# Patient Record
Sex: FEMALE | Race: OTHER | NOT HISPANIC OR LATINO
[De-identification: names, ages, dates, MRNs, and addresses within clinical notes are randomized per-mention and may not be internally consistent; named-entity substitution may affect disease eponyms.]

---

## 2021-02-12 ENCOUNTER — TRANSCRIPTION ENCOUNTER (OUTPATIENT)
Age: 31
End: 2021-02-12

## 2021-03-05 ENCOUNTER — TRANSCRIPTION ENCOUNTER (OUTPATIENT)
Age: 31
End: 2021-03-05

## 2021-04-23 ENCOUNTER — RESULT REVIEW (OUTPATIENT)
Age: 31
End: 2021-04-23

## 2021-07-30 ENCOUNTER — TRANSCRIPTION ENCOUNTER (OUTPATIENT)
Age: 31
End: 2021-07-30

## 2021-12-31 ENCOUNTER — TRANSCRIPTION ENCOUNTER (OUTPATIENT)
Age: 31
End: 2021-12-31

## 2022-01-12 ENCOUNTER — TRANSCRIPTION ENCOUNTER (OUTPATIENT)
Age: 32
End: 2022-01-12

## 2022-04-25 ENCOUNTER — RESULT REVIEW (OUTPATIENT)
Age: 32
End: 2022-04-25

## 2022-07-26 ENCOUNTER — NON-APPOINTMENT (OUTPATIENT)
Age: 32
End: 2022-07-26

## 2022-08-03 ENCOUNTER — NON-APPOINTMENT (OUTPATIENT)
Age: 32
End: 2022-08-03

## 2022-09-01 ENCOUNTER — APPOINTMENT (OUTPATIENT)
Dept: HUMAN REPRODUCTION | Facility: CLINIC | Age: 32
End: 2022-09-01

## 2022-11-17 ENCOUNTER — NON-APPOINTMENT (OUTPATIENT)
Age: 32
End: 2022-11-17

## 2022-11-30 PROBLEM — Z00.00 ENCOUNTER FOR PREVENTIVE HEALTH EXAMINATION: Status: ACTIVE | Noted: 2022-11-30

## 2022-12-02 ENCOUNTER — APPOINTMENT (OUTPATIENT)
Dept: ANTEPARTUM | Facility: CLINIC | Age: 32
End: 2022-12-02

## 2022-12-02 ENCOUNTER — ASOB RESULT (OUTPATIENT)
Age: 32
End: 2022-12-02

## 2022-12-02 PROCEDURE — 76811 OB US DETAILED SNGL FETUS: CPT

## 2022-12-02 PROCEDURE — 76817 TRANSVAGINAL US OBSTETRIC: CPT | Mod: 59

## 2022-12-16 ENCOUNTER — APPOINTMENT (OUTPATIENT)
Dept: ANTEPARTUM | Facility: CLINIC | Age: 32
End: 2022-12-16

## 2022-12-16 ENCOUNTER — ASOB RESULT (OUTPATIENT)
Age: 32
End: 2022-12-16

## 2022-12-16 PROCEDURE — 76816 OB US FOLLOW-UP PER FETUS: CPT

## 2023-02-24 ENCOUNTER — ASOB RESULT (OUTPATIENT)
Age: 33
End: 2023-02-24

## 2023-02-24 ENCOUNTER — APPOINTMENT (OUTPATIENT)
Dept: ANTEPARTUM | Facility: CLINIC | Age: 33
End: 2023-02-24
Payer: COMMERCIAL

## 2023-02-24 PROCEDURE — 76819 FETAL BIOPHYS PROFIL W/O NST: CPT | Mod: 59

## 2023-02-24 PROCEDURE — 76816 OB US FOLLOW-UP PER FETUS: CPT

## 2023-03-15 ENCOUNTER — APPOINTMENT (OUTPATIENT)
Dept: PEDIATRICS | Facility: CLINIC | Age: 33
End: 2023-03-15
Payer: COMMERCIAL

## 2023-03-15 DIAGNOSIS — Z76.81 EXPECTANT PARENT(S) PREBIRTH PEDIATRICIAN VISIT: ICD-10-CM

## 2023-03-15 PROCEDURE — ZZZZZ: CPT

## 2023-03-16 PROBLEM — Z76.81 PEDIATRIC PRE-BIRTH VISIT: Status: RESOLVED | Noted: 2023-03-16 | Resolved: 2023-03-16

## 2023-03-16 NOTE — DISCUSSION/SUMMARY
[FreeTextEntry1] : Z76.81 Pediatric Prebirth Visit\par [id number] Consult\par Inquiries for Prenatal problems that may have implications for the baby reviewed\par Breast-feeding best practices, benefits, risks of not breast-feeding also reviewed.  [email or web address] to access good information regarding breast-feeding in the first 3 days of life and beyond provided. \par Fist day and days of breast feeding summarized.  \par Importance of maternal self care as a tactic to improve breast feeding, including fixing painful latching, was emphasized.\par Risks and benefits of vaccines as well as not vaccinating within the timeframe recommended was reviewed.  This included the hepatitis B vaccine in the hospital that will be recommended.\par How to get in touch with the doctor on-call, and how to access the right online care in a de-escalating manner was reviewed.  Especially, through [email or web address], tools tab, and symptom .  This may decrease the perceived need for unnecessary access to care, and direct toward urgent care more when appropriate.  For example, 100.2 F temperature rectally is not necessarily a fever. Generic searches of fever in an infant may direct parents to emergent care unnecessarily. \par A variety of other questions were reviewed regarding newborns and  beyond that.  \par Age and development based approach to parenting reviewed. \par We will see the baby within approximately  2 days of leaving the hospital.\par .\par

## 2023-03-24 ENCOUNTER — APPOINTMENT (OUTPATIENT)
Dept: ANTEPARTUM | Facility: CLINIC | Age: 33
End: 2023-03-24

## 2023-04-09 ENCOUNTER — INPATIENT (INPATIENT)
Facility: HOSPITAL | Age: 33
LOS: 2 days | Discharge: HOME | End: 2023-04-12
Payer: COMMERCIAL

## 2023-04-09 VITALS
RESPIRATION RATE: 18 BRPM | HEART RATE: 81 BPM | TEMPERATURE: 98 F | SYSTOLIC BLOOD PRESSURE: 120 MMHG | DIASTOLIC BLOOD PRESSURE: 72 MMHG

## 2023-04-09 DIAGNOSIS — O47.1 FALSE LABOR AT OR AFTER 37 COMPLETED WEEKS OF GESTATION: ICD-10-CM

## 2023-04-09 NOTE — OB PROVIDER H&P - NSHPPHYSICALEXAM_GEN_ALL_CORE
T(C): 36.4 (04-09-23 @ 23:16), Max: 36.4 (04-09-23 @ 23:16)  HR: 81 (04-09-23 @ 23:28) (81 - 81)  BP: 120/72 (04-09-23 @ 23:28) (120/72 - 120/72)  RR: 18 (04-09-23 @ 23:16) (18 - 18)  SpO2: --    Gen: NAD, well-appearing, AAOx3   Abd: Soft, gravid  Ext: non-tender, non-edematous  SVE: 3cm dilation, 50% effacement, -3 station   Bedside sono: Vertex, anterior placenta  FHT: Baseline rate 130; moderate variability, + accels, no decels  [place]: Contractions 3-6 mins apart T(C): 36.4 (04-09-23 @ 23:16), Max: 36.4 (04-09-23 @ 23:16)  HR: 81 (04-09-23 @ 23:28) (81 - 81)  BP: 120/72 (04-09-23 @ 23:28) (120/72 - 120/72)  RR: 18 (04-09-23 @ 23:16) (18 - 18)    Gen: NAD, well-appearing, AAOx3   Abd: Soft, gravid, non tender  Ext: non-tender, non-edematous  SVE: 3/50/-3, intact    Bedside sono: Vertex, anterior placenta  FHT: Baseline rate 130; moderate variability, + accels, no decels  [place]: q3-6min

## 2023-04-09 NOTE — OB RN PATIENT PROFILE - FALL HARM RISK - UNIVERSAL INTERVENTIONS
Bed in lowest position, wheels locked, appropriate side rails in place/Call bell, personal items and telephone in reach/Instruct patient to call for assistance before getting out of bed or chair/Non-slip footwear when patient is out of bed/[place] to call system/Physically safe environment - no spills, clutter or unnecessary equipment/Purposeful Proactive Rounding/Room/bathroom lighting operational, light cord in reach

## 2023-04-09 NOTE — OB RN TRIAGE NOTE - TEMPERATURE IN CELSIUS (DEGREES C)
----- Message from Belle May sent at 2/28/2020  1:43 PM CST -----  Contact: Self [phone]  Patient would like to speak with you about a personal matter. Please advise     36.4

## 2023-04-09 NOTE — OB PROVIDER H&P - HISTORY OF PRESENT ILLNESS
Xenia is a 33y  at 39.1 weeks GA by LMP consistent with 1st trimester sono who presents to L&D for spontaneous onset of labor at term. Patient endorses CTX q2-5 minutes starting at 4pm. They are becoming more painful Patient denies vaginal bleeding or leakage of fluid. She endorses good fetal movement. No other complaints at this time. Prenatal course is remarkable only for subchorionic hemorrhage at 8 weeks that resolved at 11 weeks. Last visit was in the office on Friday when she had no cervical changes. Last sono was ~3/27 with the baby vertex.  JONATHON: 4/15/23  LMP: 22    PMH: Denies any  PSH: Denies any  SH: Denies EtOH, tobacco and illicit drug use during this pregnancy; feels safe at home   Meds: PNVs, baby aspirin   Allergies: NKDA    BMI: __  Sono: Vertex, anterior placenta  EFW:  3600g (last EFW was 3175g approximately 2 weeks ago) Xenia is a 33y  at 39.1 weeks GA by LMP consistent with 1st trimester sono who presents to L&D for spontaneous onset of labor at term. Patient endorses CTX q2-5 minutes starting at 4pm. They are becoming more painful and more frequent. Patient denies vaginal bleeding or leakage of fluid. She endorses good fetal movement. No other complaints at this time. Last prenatal visit was Friday, , exam at that time was 0/0/-3. Last sono was ~3/27 - cephalic presentation, ~ 7lbs.    JONATHON: 4/15/23  LMP: 22    PNC w Dr. Astudillo:  1. Subchorionic hemorrhage at 8wga, resolved at 11wga    POB:   G1: ectopic pregnancy (treated medically, no surgery) in 10/21  G2: SAB at 6 weeks in   G3: SAB at 6 weeks in   PGYN: + hx of ovarian cyst when younger, no hx of fibroids, denies STD hx, denies abnormal PAPs, +hx of dysmenorrhea  PMH: Denies  PSH: Denies  SH: Denies EtOH, tobacco and illicit drug use during this pregnancy; feels safe at home   Meds: PNVs, baby aspirin   Allergies: NKDA Xenia is a 33y  at 39.1 weeks GA by LMP consistent with 1st trimester sono who presents to L&D for spontaneous onset of labor at term. Patient endorses CTX q2-5 minutes starting at 4pm. They are becoming more painful and more frequent. Patient denies vaginal bleeding or leakage of fluid. She endorses good fetal movement. No other complaints at this time. Last prenatal visit was Friday, , was 0cm on exam at that time. Last sono was ~3/27 - cephalic presentation, ~ 7lbs.    JONATHON: 4/15/23  LMP: 22    PNC w Dr. Astudillo:  1. Subchorionic hemorrhage at 8wga, resolved at 11wga    POB:   G1: ectopic pregnancy (treated medically, no surgery) in   G2: SAB at 6 weeks in   G3: SAB at 6 weeks in   PGYN: + hx of ovarian cyst when younger, no hx of fibroids, denies STD hx, denies abnormal PAPs, +hx of dysmenorrhea  PMH: Denies  PSH: Denies  SH: Denies EtOH, tobacco and illicit drug use during this pregnancy; feels safe at home   Meds: PNVs, baby aspirin   Allergies: NKDA

## 2023-04-09 NOTE — OB PROVIDER H&P - ATTENDING COMMENTS
33y  at 39.1 weeks GA by LMP consistent with 1st trimester sono who presents to L&D for spontaneous onset of labor at term, consent for care signed after questions answered.

## 2023-04-09 NOTE — OB PROVIDER H&P - ASSESSMENT
Assessment: Xenia is a 33y  at 39.1 weeks GA by LMP consistent with 1st trimester sono who presents to L&D for spontaneous onset of labor at term.    Plan:   -Admit to L&D  -Consent  -Admission labs  -NPO, except ice chips   -IV fluids  -Labor: Intact membranes, contractions q3-6mins, cervical exam (3cm, 50%, -3). Likely AROM post-consent and epidural.   -Fetus: Cat I tracing. Continuous toco and fetal monitoring.   -GBS: Negative, no GBS ppx required   -Analgesia: Patient requests epidural    Discussed with Dr. Fregoso Assessment: Xenia is a 33y  at 39.1 weeks GA by LMP consistent with 1st trimester sono who presents to L&D for spontaneous onset of labor at term.    Plan:   -Admit to L&D  -Consent  -Admission labs  -NPO, except ice chips   -IV fluids  -Labor: Intact membranes, contractions q3-6mins, cervical exam (3cm, 50%, -3). For AROM post epidural.   -Fetus: Cat I tracing. Continuous toco and fetal monitoring.   -GBS: Negative, no GBS ppx required   -Analgesia: Patient requests epidural    Discussed with Dr. Fregoso and Dr. Astudillo

## 2023-04-09 NOTE — OB PROVIDER H&P - NS_OBGYNHISTORY_OBGYN_ALL_OB_FT
POB: ; ectopic pregnancy (treated medically, no surgery) in 10/21; miscarriage at 6 weeks in ; miscarriage at 6 weeks in ; current pregnancy complications per above. No hx of gestational hypertension or diabetes.  PGYN: + hx of ovarian cyst when younger, believes it has resolved; no hx of fibroids, denies STD hx, denies abnormal PAPs, +hx of dysmenorrhea see hpi

## 2023-04-10 ENCOUNTER — TRANSCRIPTION ENCOUNTER (OUTPATIENT)
Age: 33
End: 2023-04-10

## 2023-04-10 LAB
BASOPHILS # BLD AUTO: 0.05 K/UL — SIGNIFICANT CHANGE UP (ref 0–0.2)
BASOPHILS NFR BLD AUTO: 0.6 % — SIGNIFICANT CHANGE UP (ref 0–2)
BLD GP AB SCN SERPL QL: SIGNIFICANT CHANGE UP
COVID-19 SPIKE DOMAIN AB INTERP: POSITIVE
COVID-19 SPIKE DOMAIN ANTIBODY RESULT: >250 U/ML — HIGH
EOSINOPHIL # BLD AUTO: 0.04 K/UL — SIGNIFICANT CHANGE UP (ref 0–0.5)
EOSINOPHIL NFR BLD AUTO: 0.5 % — SIGNIFICANT CHANGE UP (ref 0–6)
HCT VFR BLD CALC: 23.9 % — LOW (ref 34.5–45)
HCT VFR BLD CALC: 28.8 % — LOW (ref 34.5–45)
HGB BLD-MCNC: 7.7 G/DL — LOW (ref 11.5–15.5)
HGB BLD-MCNC: 9.3 G/DL — LOW (ref 11.5–15.5)
IMM GRANULOCYTES NFR BLD AUTO: 0.7 % — SIGNIFICANT CHANGE UP (ref 0–0.9)
LYMPHOCYTES # BLD AUTO: 1.4 K/UL — SIGNIFICANT CHANGE UP (ref 1–3.3)
LYMPHOCYTES # BLD AUTO: 16.1 % — SIGNIFICANT CHANGE UP (ref 13–44)
MCHC RBC-ENTMCNC: 27 PG — SIGNIFICANT CHANGE UP (ref 27–34)
MCHC RBC-ENTMCNC: 27.1 PG — SIGNIFICANT CHANGE UP (ref 27–34)
MCHC RBC-ENTMCNC: 32.2 GM/DL — SIGNIFICANT CHANGE UP (ref 32–36)
MCHC RBC-ENTMCNC: 32.3 GM/DL — SIGNIFICANT CHANGE UP (ref 32–36)
MCV RBC AUTO: 83.5 FL — SIGNIFICANT CHANGE UP (ref 80–100)
MCV RBC AUTO: 84.2 FL — SIGNIFICANT CHANGE UP (ref 80–100)
MONOCYTES # BLD AUTO: 0.62 K/UL — SIGNIFICANT CHANGE UP (ref 0–0.9)
MONOCYTES NFR BLD AUTO: 7.2 % — SIGNIFICANT CHANGE UP (ref 2–14)
NEUTROPHILS # BLD AUTO: 6.5 K/UL — SIGNIFICANT CHANGE UP (ref 1.8–7.4)
NEUTROPHILS NFR BLD AUTO: 74.9 % — SIGNIFICANT CHANGE UP (ref 43–77)
PLATELET # BLD AUTO: 309 K/UL — SIGNIFICANT CHANGE UP (ref 150–400)
PLATELET # BLD AUTO: 386 K/UL — SIGNIFICANT CHANGE UP (ref 150–400)
RBC # BLD: 2.84 M/UL — LOW (ref 3.8–5.2)
RBC # BLD: 3.45 M/UL — LOW (ref 3.8–5.2)
RBC # FLD: 13.2 % — SIGNIFICANT CHANGE UP (ref 10.3–14.5)
RBC # FLD: 13.3 % — SIGNIFICANT CHANGE UP (ref 10.3–14.5)
RUBV IGG SER-ACNC: 2.1 INDEX — SIGNIFICANT CHANGE UP
RUBV IGG SER-IMP: POSITIVE — SIGNIFICANT CHANGE UP
SARS-COV-2 IGG+IGM SERPL QL IA: >250 U/ML — HIGH
SARS-COV-2 IGG+IGM SERPL QL IA: POSITIVE
SARS-COV-2 RNA SPEC QL NAA+PROBE: SIGNIFICANT CHANGE UP
T PALLIDUM AB TITR SER: NEGATIVE — SIGNIFICANT CHANGE UP
WBC # BLD: 19.73 K/UL — HIGH (ref 3.8–10.5)
WBC # BLD: 8.67 K/UL — SIGNIFICANT CHANGE UP (ref 3.8–10.5)
WBC # FLD AUTO: 19.73 K/UL — HIGH (ref 3.8–10.5)
WBC # FLD AUTO: 8.67 K/UL — SIGNIFICANT CHANGE UP (ref 3.8–10.5)

## 2023-04-10 RX ORDER — SIMETHICONE 80 MG/1
80 TABLET, CHEWABLE ORAL EVERY 4 HOURS
Refills: 0 | Status: DISCONTINUED | OUTPATIENT
Start: 2023-04-10 | End: 2023-04-12

## 2023-04-10 RX ORDER — DIBUCAINE 1 %
1 OINTMENT (GRAM) RECTAL EVERY 6 HOURS
Refills: 0 | Status: DISCONTINUED | OUTPATIENT
Start: 2023-04-10 | End: 2023-04-12

## 2023-04-10 RX ORDER — TETANUS TOXOID, REDUCED DIPHTHERIA TOXOID AND ACELLULAR PERTUSSIS VACCINE, ADSORBED 5; 2.5; 8; 8; 2.5 [IU]/.5ML; [IU]/.5ML; UG/.5ML; UG/.5ML; UG/.5ML
0.5 SUSPENSION INTRAMUSCULAR ONCE
Refills: 0 | Status: COMPLETED | OUTPATIENT
Start: 2023-04-10

## 2023-04-10 RX ORDER — OXYTOCIN 10 UNIT/ML
333.33 VIAL (ML) INJECTION
Qty: 20 | Refills: 0 | Status: COMPLETED | OUTPATIENT
Start: 2023-04-10 | End: 2023-04-10

## 2023-04-10 RX ORDER — OXYTOCIN 10 UNIT/ML
41.67 VIAL (ML) INJECTION
Qty: 20 | Refills: 0 | Status: DISCONTINUED | OUTPATIENT
Start: 2023-04-10 | End: 2023-04-12

## 2023-04-10 RX ORDER — CITRIC ACID/SODIUM CITRATE 300-500 MG
30 SOLUTION, ORAL ORAL ONCE
Refills: 0 | Status: DISCONTINUED | OUTPATIENT
Start: 2023-04-10 | End: 2023-04-10

## 2023-04-10 RX ORDER — MAGNESIUM HYDROXIDE 400 MG/1
30 TABLET, CHEWABLE ORAL
Refills: 0 | Status: DISCONTINUED | OUTPATIENT
Start: 2023-04-10 | End: 2023-04-12

## 2023-04-10 RX ORDER — IBUPROFEN 200 MG
600 TABLET ORAL EVERY 6 HOURS
Refills: 0 | Status: DISCONTINUED | OUTPATIENT
Start: 2023-04-10 | End: 2023-04-12

## 2023-04-10 RX ORDER — BENZOCAINE 10 %
1 GEL (GRAM) MUCOUS MEMBRANE EVERY 6 HOURS
Refills: 0 | Status: DISCONTINUED | OUTPATIENT
Start: 2023-04-10 | End: 2023-04-12

## 2023-04-10 RX ORDER — OXYCODONE HYDROCHLORIDE 5 MG/1
5 TABLET ORAL ONCE
Refills: 0 | Status: DISCONTINUED | OUTPATIENT
Start: 2023-04-10 | End: 2023-04-12

## 2023-04-10 RX ORDER — LANOLIN
1 OINTMENT (GRAM) TOPICAL EVERY 6 HOURS
Refills: 0 | Status: DISCONTINUED | OUTPATIENT
Start: 2023-04-10 | End: 2023-04-12

## 2023-04-10 RX ORDER — OXYTOCIN 10 UNIT/ML
2 VIAL (ML) INJECTION
Qty: 30 | Refills: 0 | Status: DISCONTINUED | OUTPATIENT
Start: 2023-04-10 | End: 2023-04-12

## 2023-04-10 RX ORDER — ACETAMINOPHEN 500 MG
975 TABLET ORAL
Refills: 0 | Status: DISCONTINUED | OUTPATIENT
Start: 2023-04-10 | End: 2023-04-12

## 2023-04-10 RX ORDER — IBUPROFEN 200 MG
600 TABLET ORAL EVERY 6 HOURS
Refills: 0 | Status: COMPLETED | OUTPATIENT
Start: 2023-04-10 | End: 2024-03-08

## 2023-04-10 RX ORDER — PRAMOXINE HYDROCHLORIDE 150 MG/15G
1 AEROSOL, FOAM RECTAL EVERY 4 HOURS
Refills: 0 | Status: DISCONTINUED | OUTPATIENT
Start: 2023-04-10 | End: 2023-04-12

## 2023-04-10 RX ORDER — CHLORHEXIDINE GLUCONATE 213 G/1000ML
1 SOLUTION TOPICAL ONCE
Refills: 0 | Status: DISCONTINUED | OUTPATIENT
Start: 2023-04-10 | End: 2023-04-10

## 2023-04-10 RX ORDER — DIPHENHYDRAMINE HCL 50 MG
25 CAPSULE ORAL EVERY 6 HOURS
Refills: 0 | Status: DISCONTINUED | OUTPATIENT
Start: 2023-04-10 | End: 2023-04-12

## 2023-04-10 RX ORDER — TETANUS TOXOID, REDUCED DIPHTHERIA TOXOID AND ACELLULAR PERTUSSIS VACCINE, ADSORBED 5; 2.5; 8; 8; 2.5 [IU]/.5ML; [IU]/.5ML; UG/.5ML; UG/.5ML; UG/.5ML
0.5 SUSPENSION INTRAMUSCULAR ONCE
Refills: 0 | Status: COMPLETED | OUTPATIENT
Start: 2023-04-10 | End: 2023-04-10

## 2023-04-10 RX ORDER — OXYCODONE HYDROCHLORIDE 5 MG/1
5 TABLET ORAL
Refills: 0 | Status: DISCONTINUED | OUTPATIENT
Start: 2023-04-10 | End: 2023-04-12

## 2023-04-10 RX ORDER — HYDROCORTISONE 1 %
1 OINTMENT (GRAM) TOPICAL EVERY 6 HOURS
Refills: 0 | Status: DISCONTINUED | OUTPATIENT
Start: 2023-04-10 | End: 2023-04-12

## 2023-04-10 RX ORDER — KETOROLAC TROMETHAMINE 30 MG/ML
30 SYRINGE (ML) INJECTION ONCE
Refills: 0 | Status: DISCONTINUED | OUTPATIENT
Start: 2023-04-10 | End: 2023-04-10

## 2023-04-10 RX ORDER — AER TRAVELER 0.5 G/1
1 SOLUTION RECTAL; TOPICAL EVERY 4 HOURS
Refills: 0 | Status: DISCONTINUED | OUTPATIENT
Start: 2023-04-10 | End: 2023-04-12

## 2023-04-10 RX ORDER — SODIUM CHLORIDE 9 MG/ML
1000 INJECTION, SOLUTION INTRAVENOUS
Refills: 0 | Status: DISCONTINUED | OUTPATIENT
Start: 2023-04-10 | End: 2023-04-10

## 2023-04-10 RX ORDER — SODIUM CHLORIDE 9 MG/ML
3 INJECTION INTRAMUSCULAR; INTRAVENOUS; SUBCUTANEOUS EVERY 8 HOURS
Refills: 0 | Status: DISCONTINUED | OUTPATIENT
Start: 2023-04-10 | End: 2023-04-12

## 2023-04-10 RX ADMIN — Medication 30 MILLIGRAM(S): at 14:15

## 2023-04-10 RX ADMIN — SODIUM CHLORIDE 125 MILLILITER(S): 9 INJECTION, SOLUTION INTRAVENOUS at 00:21

## 2023-04-10 RX ADMIN — Medication 600 MILLIGRAM(S): at 18:17

## 2023-04-10 RX ADMIN — SODIUM CHLORIDE 125 MILLILITER(S): 9 INJECTION, SOLUTION INTRAVENOUS at 08:21

## 2023-04-10 RX ADMIN — Medication 30 MILLIGRAM(S): at 13:25

## 2023-04-10 RX ADMIN — Medication 975 MILLIGRAM(S): at 21:30

## 2023-04-10 RX ADMIN — Medication 1000 MILLIUNIT(S)/MIN: at 13:26

## 2023-04-10 RX ADMIN — SODIUM CHLORIDE 125 MILLILITER(S): 9 INJECTION, SOLUTION INTRAVENOUS at 01:18

## 2023-04-10 RX ADMIN — Medication 600 MILLIGRAM(S): at 23:45

## 2023-04-10 RX ADMIN — Medication 2 MILLIUNIT(S)/MIN: at 04:23

## 2023-04-10 NOTE — OB PROVIDER LABOR PROGRESS NOTE - ASSESSMENT
Continue pitocin currently at 4mu  Recheck with constant pressure
Cat I   s/p AROM, clr  elaine reg  for pit if ctxs space

## 2023-04-10 NOTE — OB PROVIDER LABOR PROGRESS NOTE - NS_STATION_OBGYN_ALL_OB_NU
Vaccine Information Statement(s) was given today. This has been reviewed, questions answered, and verbal consent given by Patient for injection(s) and administration of Influenza (Inactivated).      Patient tolerated without incident. See immunization grid for documentation.        
-2
-1

## 2023-04-10 NOTE — DISCHARGE NOTE OB - HOSPITAL COURSE
S/p spontaneous vaginal delivery. Delivery was uncomplicated. She was transferred from L&D to postpartum unit without complications during her stay. Upon discharge she is voiding, tolerating PO, ambulating, lochia is decreasing, labs and vitals are stable, and pain is well controlled.

## 2023-04-10 NOTE — DISCHARGE NOTE OB - PATIENT PORTAL LINK FT
You can access the FollowMyHealth Patient Portal offered by  by registering at the following website: [email or web address]. By joining [facility]’s FollowMyHealth portal, you will also be able to view your health information using other applications (apps) compatible with our system.

## 2023-04-10 NOTE — OB RN DELIVERY SUMMARY - NS_SEPSISRSKCALC_OBGYN_ALL_OB_FT
EOS calculated successfully. EOS Risk Factor: 0.5/1000 live births ([facility] national incidence); GA=39w2d; Temp=98.6; ROM=10.4; GBS='Negative'; Antibiotics='No antibiotics or any antibiotics < 2 hrs prior to birth'

## 2023-04-10 NOTE — DISCHARGE NOTE OB - CARE PROVIDER_API CALL
Jossie Astudillo)  Obstetrics and Gynecology  [address] [place] [address]  Phone: [phone]  Fax: [phone]  Follow Up Time: 1 month

## 2023-04-10 NOTE — OB PROVIDER DELIVERY SUMMARY - NSPROVIDERDELIVERYNOTE_OBGYN_ALL_OB_FT
After a midline episiotomy was made to create enough space for delivery,  at 1244 of a live female, and Apgars 9/9. Delivered OA, no nuchal cord, clear fluid. Infant's head delivered with maternal expulsive efforts. Shoulders delivered without difficulty followed by the rest of the body. Nose and mouth were bulb suctioned. Cord blood collection was obtained. Baby handed to patient. During delivery of placenta the cord avulsed and a manual extraction was performed. MT atony noted; arm straightened to ease pitocin delivery. Fundus firm, minimal bleeding. Perineum and vagina inspected – small 2nd extension of episiotomy noted and repaired under local anesthesia with chromic. EBL 234cc. Hemostasis noted. Pt tolerated procedure well, in stable condition, recovering in LDR. Infant in LDR. Instrument/sponge count correct x 2 and confirmed by nurse.

## 2023-04-10 NOTE — OB PROVIDER LABOR PROGRESS NOTE - NS_OBIHIFHRDETAILS_OBGYN_ALL_OB_FT
140bpm baseline, moderate variability with periods of minimal, + accelerations, intermittent small variable decelerations
120 bpm mod variability +accels -decels

## 2023-04-10 NOTE — DISCHARGE NOTE OB - NS MD DC FALL RISK RISK
For information on Fall & Injury Prevention, visit: [email or web address] OR  [email or web address] OR  https://www.cdc.gov/steadi/patient.html

## 2023-04-10 NOTE — DISCHARGE NOTE OB - CARE PLAN
1 Principal Discharge DX:	Normal spontaneous vaginal delivery  Assessment and plan of treatment:	- Please call your provider to schedule a postpartum visit within 4-6 weeks. Contact information provided below.  - Prescriptions have been sent to pharmacy. Please take medications as directed.   - Patient is to continue with regular diet and activity as tolerated, nothing per vagina for 6 weeks, and exclusive breast feeding for the first 6 months is recommended.   - If you have additional concerns, or if you experience fevers > 100.4 F or heavy vaginal bleeding that is not decreasing, please inform your provider.

## 2023-04-10 NOTE — OB PROVIDER DELIVERY SUMMARY - NSSELHIDDEN_OBGYN_ALL_OB_FT
[NS_DeliveryAttending1_OBGYN_ALL_OB_FT:[id number]==],[NS_DeliveryAssist1_OBGYN_ALL_OB_FT:[id number]]

## 2023-04-10 NOTE — OB RN DELIVERY SUMMARY - NSSELHIDDEN_OBGYN_ALL_OB_FT
[NS_DeliveryAttending1_OBGYN_ALL_OB_FT:[id number]==],[NS_DeliveryAssist1_OBGYN_ALL_OB_FT:[id number]],[NS_DeliveryRN_OBGYN_ALL_OB_FT:[id number]],[NS_CirculateRN2_OBGYN_ALL_OB_FT:[id number]]

## 2023-04-10 NOTE — CHART NOTE - NSCHARTNOTEFT_GEN_A_CORE
Called to patient bedside to evaluate as pt felt faint after waking up from nap. Had some juice and crackers, was feeling better on evaluation. Denies SOB, CP, palpitation, lightheadedness, dizziness. Vitals stable. Mild tachycardia. Will continue to monitor.

## 2023-04-10 NOTE — DISCHARGE NOTE OB - MEDICATION SUMMARY - MEDICATIONS TO TAKE
I will START or STAY ON the medications listed below when I get home from the hospital:    ibuprofen 600 mg oral tablet  -- 1 tab(s) by mouth every 6 hours as needed for  moderate pain  -- Indication: For pain    Tylenol 325 mg oral capsule  -- 3 cap(s) by mouth every 6 hours as needed for  moderate pain  -- Indication: For pain

## 2023-04-11 LAB
HCT VFR BLD CALC: 27 % — LOW (ref 34.5–45)
HGB BLD-MCNC: 8.4 G/DL — LOW (ref 11.5–15.5)

## 2023-04-11 RX ADMIN — Medication 975 MILLIGRAM(S): at 02:44

## 2023-04-11 RX ADMIN — OXYCODONE HYDROCHLORIDE 5 MILLIGRAM(S): 5 TABLET ORAL at 18:23

## 2023-04-11 RX ADMIN — Medication 975 MILLIGRAM(S): at 21:08

## 2023-04-11 RX ADMIN — Medication 600 MILLIGRAM(S): at 06:18

## 2023-04-11 RX ADMIN — Medication 600 MILLIGRAM(S): at 13:10

## 2023-04-11 RX ADMIN — Medication 600 MILLIGRAM(S): at 12:13

## 2023-04-11 RX ADMIN — SODIUM CHLORIDE 3 MILLILITER(S): 9 INJECTION INTRAMUSCULAR; INTRAVENOUS; SUBCUTANEOUS at 15:56

## 2023-04-11 RX ADMIN — OXYCODONE HYDROCHLORIDE 5 MILLIGRAM(S): 5 TABLET ORAL at 10:24

## 2023-04-11 RX ADMIN — Medication 975 MILLIGRAM(S): at 14:55

## 2023-04-11 RX ADMIN — OXYCODONE HYDROCHLORIDE 5 MILLIGRAM(S): 5 TABLET ORAL at 17:23

## 2023-04-11 RX ADMIN — OXYCODONE HYDROCHLORIDE 5 MILLIGRAM(S): 5 TABLET ORAL at 09:24

## 2023-04-11 RX ADMIN — Medication 975 MILLIGRAM(S): at 15:55

## 2023-04-11 RX ADMIN — Medication 1 TABLET(S): at 12:13

## 2023-04-11 NOTE — PROGRESS NOTE ADULT - SUBJECTIVE AND OBJECTIVE BOX
INTERVAL HPI/OVERNIGHT EVENTS:  33y Female s/p labor epidural on 4/10/2023    Vital Signs Last 24 Hrs  T(C): 36.8 (11 Apr 2023 15:24), Max: 36.8 (11 Apr 2023 15:24)  T(F): 98.2 (11 Apr 2023 15:24), Max: 98.2 (11 Apr 2023 15:24)  HR: 78 (11 Apr 2023 15:24) (78 - 89)  BP: 96/61 (11 Apr 2023 15:24) (96/61 - 108/63)  BP(mean): --  RR: 18 (11 Apr 2023 15:24) (16 - 18)  SpO2: 98% (11 Apr 2023 15:24) (96% - 98%)            Patient's overall anesthesia satisfaction: Positive    Patient seen and doing well     No headache      No residual numbness or weakness, sensory and motor function intact    Site examined    No anesthetic complications or complaints noted or reported

## 2023-04-11 NOTE — PROGRESS NOTE ADULT - SUBJECTIVE AND OBJECTIVE BOX
SURI DANG is a 33y  now PPD#1 s/p spontaneous vaginal delivery at 39w2d bgestation, c/b cord avulsion requiring manual placenta extraction.    S:    No acute events overnight.   Pt reports sharp vaginal pain that results in nausea when standing. Using ice packs currently has not tried oxycodone.   Tolerating PO.   + flatus/-BM/+ voiding   She endorses appropriate lochia, which is decreasing.    She denies fevers, chills, nausea and vomiting.   She denies lightheadedness, dizziness, palpitations, chest pain and SOB.     O:    T(C): 36.7 (04-10-23 @ 19:45), Max: 37.2 (04-10-23 @ 17:29)  HR: 89 (04-10-23 @ 19:45) (79 - 120)  BP: 108/63 (04-10-23 @ 19:45) (92/48 - 134/60)  RR: 16 (04-10-23 @ 19:45) (16 - 16)  SpO2: 98% (04-10-23 @ 19:45) (96% - 100%)    Gen: NAD, AOx3  Breast: Nontender, non-engorged   Abdomen:  Soft, non-tender, non-distended  Uterus:  Fundus firm below umbilicus  VE:  Expectant lochia  Ext:  Non-tender and non-edematous                          7.7    19.73 )-----------( 309      ( 10 Apr 2023 21:19 )             23.9            SURI DANG is a 33y  now PPD#1 s/p spontaneous vaginal delivery at 39w2d bgestation, c/b cord avulsion requiring manual placenta extraction.    S:    No acute events overnight.   Pt reports sharp vaginal pain that results in nausea when standing. Using ice packs currently has not tried oxycodone.   Tolerating PO.   + flatus/-BM/+ voiding   She endorses appropriate lochia, which is decreasing.    She denies fevers, chills, nausea and vomiting.   She denies lightheadedness, dizziness, palpitations, chest pain and SOB.     O:    T(C): 36.7 (04-10-23 @ 19:45), Max: 37.2 (04-10-23 @ 17:29)  HR: 89 (04-10-23 @ 19:45) (79 - 120)  BP: 108/63 (04-10-23 @ 19:45) (92/48 - 134/60)  RR: 16 (04-10-23 @ 19:45) (16 - 16)  SpO2: 98% (04-10-23 @ 19:45) (96% - 100%)    Gen: NAD, AOx3  Breast: Nontender, non-engorged   Abdomen:  Soft, non-tender, non-distended  Uterus:  Fundus firm below umbilicus  VE:  Expectant lochia, swelling noted, hemorrhoid noted  Ext:  Non-tender and non-edematous                          7.7    19.73 )-----------( 309      ( 10 Apr 2023 21:19 )             23.9

## 2023-04-11 NOTE — PROGRESS NOTE ADULT - ASSESSMENT
SURI DANG is a 33y  now PPD#1 s/p spontaneous vaginal delivery at 39w2d bgestation, c/b cord avulsion requiring manual placenta extraction.    -Vital signs stable  -Hgb: 9.3>7.7  -Voiding, tolerating PO, bowel function nml   -Advance care as tolerated   -Encourage trialing oxycodone and will reassess the patient's pain  -Continue routine postpartum care and education  -Healthy female infant  -Dispo: Patient to be discharged when meeting all postpartum and postoperative milestones and pending attending approval.    Will d/w Dr. Everett   SURI DANG is a 33y  now PPD#1 s/p spontaneous vaginal delivery at 39w2d bgestation, c/b cord avulsion requiring manual placenta extraction.    -Vital signs stable  -Hgb: 9.3>7.7  -Voiding, tolerating PO, bowel function nml   -Advance care as tolerated   -Encourage trialing oxycodone and will reassess the patient's pain  -Continue routine postpartum care and education  -Healthy female infant  -Dispo: Patient to be discharged when meeting all postpartum and postoperative milestones and pending attending approval.    Will d/w Dr. Astudillo

## 2023-04-12 VITALS
OXYGEN SATURATION: 98 % | RESPIRATION RATE: 18 BRPM | SYSTOLIC BLOOD PRESSURE: 112 MMHG | DIASTOLIC BLOOD PRESSURE: 71 MMHG | HEART RATE: 73 BPM | TEMPERATURE: 98 F

## 2023-04-12 PROCEDURE — 86780 TREPONEMA PALLIDUM: CPT

## 2023-04-12 PROCEDURE — 85025 COMPLETE CBC W/AUTO DIFF WBC: CPT

## 2023-04-12 PROCEDURE — 86850 RBC ANTIBODY SCREEN: CPT

## 2023-04-12 PROCEDURE — 85014 HEMATOCRIT: CPT

## 2023-04-12 PROCEDURE — 86900 BLOOD TYPING SEROLOGIC ABO: CPT

## 2023-04-12 PROCEDURE — U0005: CPT

## 2023-04-12 PROCEDURE — 86901 BLOOD TYPING SEROLOGIC RH(D): CPT

## 2023-04-12 PROCEDURE — 90715 TDAP VACCINE 7 YRS/> IM: CPT

## 2023-04-12 PROCEDURE — 90707 MMR VACCINE SC: CPT

## 2023-04-12 PROCEDURE — 86762 RUBELLA ANTIBODY: CPT

## 2023-04-12 PROCEDURE — 85027 COMPLETE CBC AUTOMATED: CPT

## 2023-04-12 PROCEDURE — 86769 SARS-COV-2 COVID-19 ANTIBODY: CPT

## 2023-04-12 PROCEDURE — U0003: CPT

## 2023-04-12 PROCEDURE — 85018 HEMOGLOBIN: CPT

## 2023-04-12 PROCEDURE — 36415 COLL VENOUS BLD VENIPUNCTURE: CPT

## 2023-04-12 RX ORDER — TETANUS TOXOID, REDUCED DIPHTHERIA TOXOID AND ACELLULAR PERTUSSIS VACCINE, ADSORBED 5; 2.5; 8; 8; 2.5 [IU]/.5ML; [IU]/.5ML; UG/.5ML; UG/.5ML; UG/.5ML
0.5 SUSPENSION INTRAMUSCULAR ONCE
Refills: 0 | Status: DISCONTINUED | OUTPATIENT
Start: 2023-04-12 | End: 2023-04-12

## 2023-04-12 RX ORDER — TETANUS TOXOID, REDUCED DIPHTHERIA TOXOID AND ACELLULAR PERTUSSIS VACCINE, ADSORBED 5; 2.5; 8; 8; 2.5 [IU]/.5ML; [IU]/.5ML; UG/.5ML; UG/.5ML; UG/.5ML
0.5 SUSPENSION INTRAMUSCULAR ONCE
Refills: 0 | Status: COMPLETED | OUTPATIENT
Start: 2023-04-12 | End: 2023-04-12

## 2023-04-12 RX ORDER — TETANUS AND DIPHTHERIA TOXOIDS ADSORBED 2; 2 [LF]/.5ML; [LF]/.5ML
0.5 INJECTION INTRAMUSCULAR ONCE
Refills: 0 | Status: DISCONTINUED | OUTPATIENT
Start: 2023-04-12 | End: 2023-04-12

## 2023-04-12 RX ORDER — IBUPROFEN 200 MG
1 TABLET ORAL
Qty: 28 | Refills: 0
Start: 2023-04-12 | End: 2023-04-18

## 2023-04-12 RX ORDER — ACETAMINOPHEN 500 MG
3 TABLET ORAL
Qty: 84 | Refills: 0
Start: 2023-04-12 | End: 2023-04-18

## 2023-04-12 RX ADMIN — TETANUS TOXOID, REDUCED DIPHTHERIA TOXOID AND ACELLULAR PERTUSSIS VACCINE, ADSORBED 0.5 MILLILITER(S): 5; 2.5; 8; 8; 2.5 SUSPENSION INTRAMUSCULAR at 16:44

## 2023-04-12 RX ADMIN — Medication 600 MILLIGRAM(S): at 12:33

## 2023-04-12 RX ADMIN — Medication 975 MILLIGRAM(S): at 09:20

## 2023-04-12 RX ADMIN — Medication 975 MILLIGRAM(S): at 03:40

## 2023-04-12 RX ADMIN — Medication 975 MILLIGRAM(S): at 08:55

## 2023-04-12 RX ADMIN — Medication 600 MILLIGRAM(S): at 05:17

## 2023-04-12 RX ADMIN — SODIUM CHLORIDE 3 MILLILITER(S): 9 INJECTION INTRAMUSCULAR; INTRAVENOUS; SUBCUTANEOUS at 00:30

## 2023-04-12 RX ADMIN — Medication 1 TABLET(S): at 11:40

## 2023-04-12 RX ADMIN — SODIUM CHLORIDE 3 MILLILITER(S): 9 INJECTION INTRAMUSCULAR; INTRAVENOUS; SUBCUTANEOUS at 05:31

## 2023-04-12 RX ADMIN — Medication 600 MILLIGRAM(S): at 11:40

## 2023-04-12 RX ADMIN — Medication 600 MILLIGRAM(S): at 00:24

## 2023-04-12 RX ADMIN — Medication 975 MILLIGRAM(S): at 14:28

## 2023-04-12 RX ADMIN — Medication 0.5 MILLILITER(S): at 16:42

## 2023-04-12 NOTE — PROGRESS NOTE ADULT - ATTENDING COMMENTS
As above, pt is now PPD#2 S/P  recovering well.  Pt was having moderate to severe pain As above, pt is now PPD#2 S/P  recovering well.  Pt was having moderate to severe pain with any movement and had reservation re going home - but she improved markedly throughout the day and ready for D/C home.   Pelvic exam was done (by me this am) and no abnormalities seen - no hematoma, suture line healing well, no erythema, no excess swelling.  Pt tolerated exam well.  OK for D/C home.

## 2023-04-12 NOTE — PROGRESS NOTE ADULT - ASSESSMENT
SURI DANG is a 33y  now PPD#2 s/p spontaneous vaginal delivery at 39w2d bgestation, c/b cord avulsion requiring manual placenta extraction.    -Vital signs stable  -Hgb: 9.3>8.4  -Voiding, tolerating PO, bowel function nml   -Advance care as tolerated   -Continue routine postpartum care and education  -Healthy female infant  -Dispo: Pt is stable, doing well and meeting all postpartum and postoperative milestones. Possible discharge to home today pending attending approval.    Will d/w Dr. Astudillo     SURI DANG is a 33y  now PPD#2 s/p spontaneous vaginal delivery at 39w2d gestation, c/b cord avulsion requiring manual placenta extraction.    -Vital signs stable  -Hgb: 9.3>8.4  -Voiding, tolerating PO, bowel function nml   -Advance care as tolerated   -Continue routine postpartum care and education  -Healthy female infant  -Dispo: Pt is stable, doing well and meeting all postpartum and postoperative milestones. Possible discharge to home today pending attending approval.    Will d/w Dr. Astudillo

## 2023-04-12 NOTE — PROGRESS NOTE ADULT - SUBJECTIVE AND OBJECTIVE BOX
SURI DANG is a 33y  now PPD#2 s/p spontaneous vaginal delivery at 39w2d bgestation, c/b cord avulsion requiring manual placenta extraction.    S:    No acute events overnight.   The patient has no complaints. Vaginal pain overall improved; occasional sharp 10/10 pain.  Pain controlled with current treatment regimen.   She is ambulating without difficulty and tolerating PO.   + flatus/-BM/+ voiding   She endorses appropriate lochia, which is decreasing.   She denies fevers, chills, nausea and vomiting.   She denies lightheadedness, dizziness, palpitations, chest pain and SOB.     O:    T(C): 36.9 (23 @ 04:57), Max: 36.9 (23 @ 04:57)  HR: 81 (23 @ 04:57) (78 - 82)  BP: 121/70 (23 @ 04:57) (96/61 - 121/70)  RR: 18 (23 @ 04:57) (16 - 18)  SpO2: 96% (23 @ 04:57) (96% - 98%)    Gen: NAD, AOx3  Breast: Nontender, non-engorged   Abdomen:  Soft, non-tender, non-distended  Uterus:  Fundus firm below umbilicus  VE:  Expectant lochia  Ext:  Non-tender and non-edematous                          8.4    x     )-----------( x        ( 2023 05:29 )             27.0

## 2025-02-05 ENCOUNTER — NON-APPOINTMENT (OUTPATIENT)
Age: 35
End: 2025-02-05

## 2025-08-06 ENCOUNTER — OFFICE (OUTPATIENT)
Dept: URBAN - METROPOLITAN AREA CLINIC 104 | Facility: CLINIC | Age: 35
Setting detail: OPHTHALMOLOGY
End: 2025-08-06
Payer: COMMERCIAL

## 2025-08-06 PROBLEM — H02.834 DERMATOCHALASIS; RIGHT UPPER LID, LEFT UPPER LID: Status: ACTIVE | Noted: 2025-08-06

## 2025-08-06 PROBLEM — H02.831 DERMATOCHALASIS; RIGHT UPPER LID, LEFT UPPER LID: Status: ACTIVE | Noted: 2025-08-06

## 2025-08-06 PROCEDURE — SCCOS COSMETIC CONSULTATION: Performed by: OPHTHALMOLOGY

## 2025-08-06 ASSESSMENT — LID POSITION - DERMATOCHALASIS
OS_DERMATOCHALASIS: LUL 2+
OD_DERMATOCHALASIS: RUL 2+

## 2025-08-06 ASSESSMENT — VISUAL ACUITY
OD_BCVA: 20/20
OS_BCVA: 20/20-1

## 2025-08-06 ASSESSMENT — CONFRONTATIONAL VISUAL FIELD TEST (CVF)
OD_FINDINGS: FULL
OS_FINDINGS: FULL